# Patient Record
Sex: FEMALE | Race: ASIAN | NOT HISPANIC OR LATINO
[De-identification: names, ages, dates, MRNs, and addresses within clinical notes are randomized per-mention and may not be internally consistent; named-entity substitution may affect disease eponyms.]

---

## 2023-07-12 ENCOUNTER — TRANSCRIPTION ENCOUNTER (OUTPATIENT)
Age: 33
End: 2023-07-12

## 2023-07-13 ENCOUNTER — INPATIENT (INPATIENT)
Facility: HOSPITAL | Age: 33
LOS: 0 days | Discharge: HOME | End: 2023-07-13
Attending: OBSTETRICS & GYNECOLOGY | Admitting: OBSTETRICS & GYNECOLOGY
Payer: COMMERCIAL

## 2023-07-13 ENCOUNTER — RESULT REVIEW (OUTPATIENT)
Age: 33
End: 2023-07-13

## 2023-07-13 ENCOUNTER — TRANSCRIPTION ENCOUNTER (OUTPATIENT)
Age: 33
End: 2023-07-13

## 2023-07-13 VITALS
RESPIRATION RATE: 16 BRPM | DIASTOLIC BLOOD PRESSURE: 67 MMHG | OXYGEN SATURATION: 100 % | HEART RATE: 84 BPM | SYSTOLIC BLOOD PRESSURE: 109 MMHG

## 2023-07-13 VITALS
OXYGEN SATURATION: 100 % | HEART RATE: 88 BPM | TEMPERATURE: 98 F | DIASTOLIC BLOOD PRESSURE: 57 MMHG | SYSTOLIC BLOOD PRESSURE: 103 MMHG | RESPIRATION RATE: 15 BRPM

## 2023-07-13 DIAGNOSIS — O00.90 UNSPECIFIED ECTOPIC PREGNANCY WITHOUT INTRAUTERINE PREGNANCY: ICD-10-CM

## 2023-07-13 LAB
ALBUMIN SERPL ELPH-MCNC: 4.5 G/DL — SIGNIFICANT CHANGE UP (ref 3.3–5)
ALP SERPL-CCNC: 64 U/L — SIGNIFICANT CHANGE UP (ref 40–120)
ALT FLD-CCNC: 19 U/L — SIGNIFICANT CHANGE UP (ref 4–33)
ANION GAP SERPL CALC-SCNC: 15 MMOL/L — HIGH (ref 7–14)
APTT BLD: 23.4 SEC — LOW (ref 27–36.3)
AST SERPL-CCNC: 24 U/L — SIGNIFICANT CHANGE UP (ref 4–32)
BASOPHILS # BLD AUTO: 0.04 K/UL — SIGNIFICANT CHANGE UP (ref 0–0.2)
BASOPHILS NFR BLD AUTO: 0.3 % — SIGNIFICANT CHANGE UP (ref 0–2)
BILIRUB SERPL-MCNC: 0.8 MG/DL — SIGNIFICANT CHANGE UP (ref 0.2–1.2)
BLD GP AB SCN SERPL QL: NEGATIVE — SIGNIFICANT CHANGE UP
BUN SERPL-MCNC: 9 MG/DL — SIGNIFICANT CHANGE UP (ref 7–23)
CALCIUM SERPL-MCNC: 9.7 MG/DL — SIGNIFICANT CHANGE UP (ref 8.4–10.5)
CHLORIDE SERPL-SCNC: 99 MMOL/L — SIGNIFICANT CHANGE UP (ref 98–107)
CO2 SERPL-SCNC: 17 MMOL/L — LOW (ref 22–31)
CREAT SERPL-MCNC: 0.72 MG/DL — SIGNIFICANT CHANGE UP (ref 0.5–1.3)
EGFR: 115 ML/MIN/1.73M2 — SIGNIFICANT CHANGE UP
EOSINOPHIL # BLD AUTO: 0.02 K/UL — SIGNIFICANT CHANGE UP (ref 0–0.5)
EOSINOPHIL NFR BLD AUTO: 0.2 % — SIGNIFICANT CHANGE UP (ref 0–6)
GLUCOSE SERPL-MCNC: 124 MG/DL — HIGH (ref 70–99)
HCG SERPL-ACNC: 2462 MIU/ML — SIGNIFICANT CHANGE UP
HCT VFR BLD CALC: 33.5 % — LOW (ref 34.5–45)
HGB BLD-MCNC: 10.9 G/DL — LOW (ref 11.5–15.5)
IANC: 10.8 K/UL — HIGH (ref 1.8–7.4)
IMM GRANULOCYTES NFR BLD AUTO: 0.2 % — SIGNIFICANT CHANGE UP (ref 0–0.9)
INR BLD: 1.18 RATIO — HIGH (ref 0.88–1.16)
LYMPHOCYTES # BLD AUTO: 1.01 K/UL — SIGNIFICANT CHANGE UP (ref 1–3.3)
LYMPHOCYTES # BLD AUTO: 8.2 % — LOW (ref 13–44)
MCHC RBC-ENTMCNC: 26.3 PG — LOW (ref 27–34)
MCHC RBC-ENTMCNC: 32.5 GM/DL — SIGNIFICANT CHANGE UP (ref 32–36)
MCV RBC AUTO: 80.7 FL — SIGNIFICANT CHANGE UP (ref 80–100)
MONOCYTES # BLD AUTO: 0.41 K/UL — SIGNIFICANT CHANGE UP (ref 0–0.9)
MONOCYTES NFR BLD AUTO: 3.3 % — SIGNIFICANT CHANGE UP (ref 2–14)
NEUTROPHILS # BLD AUTO: 10.8 K/UL — HIGH (ref 1.8–7.4)
NEUTROPHILS NFR BLD AUTO: 87.8 % — HIGH (ref 43–77)
NRBC # BLD: 0 /100 WBCS — SIGNIFICANT CHANGE UP (ref 0–0)
NRBC # FLD: 0 K/UL — SIGNIFICANT CHANGE UP (ref 0–0)
PLATELET # BLD AUTO: 261 K/UL — SIGNIFICANT CHANGE UP (ref 150–400)
POTASSIUM SERPL-MCNC: 4.2 MMOL/L — SIGNIFICANT CHANGE UP (ref 3.5–5.3)
POTASSIUM SERPL-SCNC: 4.2 MMOL/L — SIGNIFICANT CHANGE UP (ref 3.5–5.3)
PROT SERPL-MCNC: 7.8 G/DL — SIGNIFICANT CHANGE UP (ref 6–8.3)
PROTHROM AB SERPL-ACNC: 13.7 SEC — HIGH (ref 10.5–13.4)
RBC # BLD: 4.15 M/UL — SIGNIFICANT CHANGE UP (ref 3.8–5.2)
RBC # FLD: 13.9 % — SIGNIFICANT CHANGE UP (ref 10.3–14.5)
RH IG SCN BLD-IMP: POSITIVE — SIGNIFICANT CHANGE UP
SODIUM SERPL-SCNC: 131 MMOL/L — LOW (ref 135–145)
WBC # BLD: 12.31 K/UL — HIGH (ref 3.8–10.5)
WBC # FLD AUTO: 12.31 K/UL — HIGH (ref 3.8–10.5)

## 2023-07-13 PROCEDURE — 76817 TRANSVAGINAL US OBSTETRIC: CPT | Mod: 26

## 2023-07-13 PROCEDURE — 88305 TISSUE EXAM BY PATHOLOGIST: CPT | Mod: 26

## 2023-07-13 PROCEDURE — 58661 LAPAROSCOPY REMOVE ADNEXA: CPT | Mod: GC

## 2023-07-13 PROCEDURE — 99285 EMERGENCY DEPT VISIT HI MDM: CPT

## 2023-07-13 RX ORDER — SODIUM CHLORIDE 9 MG/ML
1000 INJECTION, SOLUTION INTRAVENOUS
Refills: 0 | Status: DISCONTINUED | OUTPATIENT
Start: 2023-07-13 | End: 2023-07-13

## 2023-07-13 RX ORDER — FENTANYL CITRATE 50 UG/ML
25 INJECTION INTRAVENOUS
Refills: 0 | Status: DISCONTINUED | OUTPATIENT
Start: 2023-07-13 | End: 2023-07-13

## 2023-07-13 RX ORDER — ACETAMINOPHEN 500 MG
1000 TABLET ORAL ONCE
Refills: 0 | Status: COMPLETED | OUTPATIENT
Start: 2023-07-13 | End: 2023-07-13

## 2023-07-13 RX ORDER — ONDANSETRON 8 MG/1
4 TABLET, FILM COATED ORAL ONCE
Refills: 0 | Status: DISCONTINUED | OUTPATIENT
Start: 2023-07-13 | End: 2023-07-13

## 2023-07-13 RX ORDER — SODIUM CHLORIDE 9 MG/ML
1000 INJECTION INTRAMUSCULAR; INTRAVENOUS; SUBCUTANEOUS ONCE
Refills: 0 | Status: COMPLETED | OUTPATIENT
Start: 2023-07-13 | End: 2023-07-13

## 2023-07-13 RX ORDER — OXYCODONE HYDROCHLORIDE 5 MG/1
1 TABLET ORAL
Qty: 5 | Refills: 0
Start: 2023-07-13

## 2023-07-13 RX ORDER — MORPHINE SULFATE 50 MG/1
2 CAPSULE, EXTENDED RELEASE ORAL ONCE
Refills: 0 | Status: DISCONTINUED | OUTPATIENT
Start: 2023-07-13 | End: 2023-07-13

## 2023-07-13 RX ORDER — FENTANYL CITRATE 50 UG/ML
50 INJECTION INTRAVENOUS
Refills: 0 | Status: DISCONTINUED | OUTPATIENT
Start: 2023-07-13 | End: 2023-07-13

## 2023-07-13 RX ADMIN — SODIUM CHLORIDE 1000 MILLILITER(S): 9 INJECTION INTRAMUSCULAR; INTRAVENOUS; SUBCUTANEOUS at 02:01

## 2023-07-13 RX ADMIN — Medication 1000 MILLIGRAM(S): at 02:35

## 2023-07-13 RX ADMIN — MORPHINE SULFATE 2 MILLIGRAM(S): 50 CAPSULE, EXTENDED RELEASE ORAL at 03:12

## 2023-07-13 RX ADMIN — Medication 400 MILLIGRAM(S): at 02:01

## 2023-07-13 NOTE — H&P ADULT - NSHPLABSRESULTS_GEN_ALL_CORE
LABS:                        10.9   12.31 )-----------( 261      ( 13 Jul 2023 01:41 )             33.5     07-13    131<L>  |  99  |  9   ----------------------------<  124<H>  4.2   |  17<L>  |  0.72    Ca    9.7      13 Jul 2023 01:41    TPro  7.8  /  Alb  4.5  /  TBili  0.8  /  DBili  x   /  AST  24  /  ALT  19  /  AlkPhos  64  07-13      Urinalysis Basic - ( 13 Jul 2023 01:41 )    Color: x / Appearance: x / SG: x / pH: x  Gluc: 124 mg/dL / Ketone: x  / Bili: x / Urobili: x   Blood: x / Protein: x / Nitrite: x   Leuk Esterase: x / RBC: x / WBC x   Sq Epi: x / Non Sq Epi: x / Bacteria: x      RADIOLOGY & ADDITIONAL STUDIES:  < from: US Transvaginal, OB (07.13.23 @ 04:52) >  ACC: [id number] EXAM:  US OB TRANSVAGINAL   ORDERED BY: ADRIAN LEA     PROCEDURE DATE:  07/13/2023          INTERPRETATION:  CLINICAL INFORMATION: Pelvic pain and vaginal spotting.   History of ovarian torsion status post cystectomy. Beta-hCG level 2462.    LMP: 06/03/2023    Estimated Gestational Age by LMP: 5 weeks 5 days    COMPARISON: None available.    Endovaginal and transabdominal pelvic sonogram. Color and Spectral   Doppler was performed.    FINDINGS:  Uterus: No intrauterine gestation. Thickened endometrium measuring 1.8   cm. There is a 1.4 x 1.8 x 1.7 cm posterior intramural fibroid.    Right ovary: 3.7 cm x 2.9 cm x 2.8 cm. Within normal limits. Corpus   luteal cyst. Normal arterial and venous waveforms.  Left ovary: Not welldelineated. There is a left adnexal complex   heterogeneous lesion measuring 2.1 x 2.3 x 1.4 cm with a hyperechoic   ring, internal anechoic region, and peripheral vascularity.    Fluid: Complex fluid within the cul-de-sac and adjacent to the bilateral   adnexa.    IMPRESSION:  Findings concerning for ruptured left adnexal ectopic pregnancy.    Findings were discussed by Dr. Michael with Dr. Lea on 7/13/2023   4:26 AM with read back confirmation.    --- End of Report ---      ALEISHA MICHAEL MD; Resident Radiologist  This document has been electronically signed.  ZAK PAUL MD; Attending Radiologist  This document has been electronically signed. Jul 13 2023  5:00AM    < end of copied text >

## 2023-07-13 NOTE — BRIEF OPERATIVE NOTE - NSICDXBRIEFPREOP_GEN_ALL_CORE_FT
PRE-OP DIAGNOSIS:  Ruptured left tubal ectopic pregnancy causing hemoperitoneum 13-Jul-2023 10:37:02  Ladarius Lopez

## 2023-07-13 NOTE — H&P ADULT - NSHPPHYSICALEXAM_GEN_ALL_CORE
Vital Signs Last 24 Hrs  T(C): 36.8 (13 Jul 2023 04:40), Max: 37.1 (13 Jul 2023 01:55)  T(F): 98.3 (13 Jul 2023 04:40), Max: 98.7 (13 Jul 2023 01:55)  HR: 71 (13 Jul 2023 04:40) (71 - 84)  BP: 108/62 (13 Jul 2023 04:40) (108/62 - 109/67)  RR: 18 (13 Jul 2023 04:40) (16 - 18)  SpO2: 100% (13 Jul 2023 04:40) (100% - 100%)    Parameters below as of 13 Jul 2023 04:40  Patient On (Oxygen Delivery Method): room air    Physical Exam:   General: sitting comfortably in bed, NAD   HEENT: neck supple, full ROM  CV: RR S1S2 no m/r/g  Lungs: CTA b/l, good air flow b/l   Back: No CVA tenderness  Abd: Soft, non-tender, non-distended.  Bowel sounds present.    :  No bleeding on pad. External labia wnl. Bimanual exam with no cervical motion tenderness, uterus wnl, adnexa non palpable b/l.  Cervix closed vs. Cervix dilated *** cm   Speculum Exam: No active bleeding from os. Physiologic discharge.    Ext: non-tender b/l, no edema

## 2023-07-13 NOTE — ED PROVIDER NOTE - PHYSICAL EXAMINATION
GENERAL: Awake, alert, NAD  HEENT: NC/AT, moist mucous membranes, PERRL, EOMI  LUNGS: CTAB, no wheezes or crackles   CARDIAC: RRR, no m/r/g  ABDOMEN: Lower abdomen/pelvis TTP. Soft, normal BS, non distended, no rebound, no guarding  : Pelvic exam chaperoned by Dr. Corcoran - os closed, blood pooled in vault, diffusely tender on internal bimanual exam, no abnormalities externally  BACK: No midline spinal tenderness, no CVA tenderness  EXT: No edema, no calf tenderness, 2+ DP pulses bilaterally, no deformities.  NEURO: A&Ox3. Moving all extremities.  SKIN: Warm and dry. No rash.  PSYCH: Normal affect.

## 2023-07-13 NOTE — CHART NOTE - NSCHARTNOTEFT_GEN_A_CORE
Patient's , Myke Nino, is the emergency contact with cell phone number [phone]. This number is both on file in [place] and was provided to me personally by the patient prior to surgery. I attempted contacting the  at 10:40am to update him that pt is out of surgery, no answer, voicemail left. I called again at 12:10pm, no answer.  is not physically present in surgical waiting area.    Seamus PGY2

## 2023-07-13 NOTE — H&P ADULT - ATTENDING COMMENTS
R/A/B of EUA and laparoscopic salpingectomy/removal for presumed ruptured ectopic discussed with pt.  Pt aware that there is a possibility of laparotomy.  Risk of bleeding, injury to bladder/bowel/ureters/vessels d/w with pt.  All questions and concerns were addressed

## 2023-07-13 NOTE — H&P ADULT - ASSESSMENT
A/P: 33yo  (LMP 6/3) at 5w5d presenting with abdominal pain and VB x2d with TVUS concerning for ruptured L adnexal ectopic pregnancy with complex fluid within the cul-de-sac and adjacent to the b/l adnexa. In the ED, VSS, patient with some relief of pain with morphine and IV Tylenol. H/H 10.9/33.5. Patient is hemodynamically stable. Plan to admit patient to GYN for emergent surgical management.    Neuro: IV pain medication prn  CV: Patient hemodynamically stable- will continue to monitor vitals closely.   Pulm: saturating well on room air   GI: NPO for OR   : Feldman to be placed intra-operatively.   Reproductive: Ruptured ectopic pregnancy:  patient to go to OR for diagnostic laparoscopy, unilateral salpingectomy, possible unilateral oopherectomy, possible exploratory laparotomy.  Patient counseled on risks of surgery including bleeding, infection and damage to surrounding organs.  All questions/concerns of patient addressed. All consents signed with patient.    Heme: SCD's in OR for DVT ppx.  Aggressive and early ambulation post-operatively for DVT ppx.   ID: afebrile   FEN: LR@125.  Replete electolytes prn   Dispo: To OR for procedure as detailed above    D/w Dr. Mars,  and Dr. Walker, GYN surgeon  Jewel Boothe, PGY-2   A/P: 31yo  (LMP 6/3) at 5w5d presenting with abdominal pain and VB x2d with TVUS concerning for ruptured L adnexal ectopic pregnancy with complex fluid within the cul-de-sac and adjacent to the b/l adnexa. In the ED, VSS, patient with some relief of pain with morphine and IV Tylenol. H/H 10.9/33.5. Patient is hemodynamically stable. Plan to admit patient to GYN for emergent surgical management.    Neuro: IV pain medication prn  CV: Patient hemodynamically stable- will continue to monitor vitals closely.   Pulm: saturating well on room air   GI: NPO for OR   : Feldman to be placed intra-operatively.   Reproductive: Ruptured ectopic pregnancy:  patient to go to OR for diagnostic laparoscopy, unilateral salpingectomy, possible unilateral oopherectomy, possible exploratory laparotomy.  Patient counseled on risks of surgery including bleeding, infection and damage to surrounding organs.  All questions/concerns of patient addressed. All consents signed with patient.    Heme: SCD's in OR for DVT ppx.  Aggressive and early ambulation post-operatively for DVT ppx.   ID: afebrile   FEN: LR@125.  Replete electolytes prn   Dispo: To OR for procedure as detailed above    D/w Dr. Mars,  and Dr. Walker, GYN surgeon  Jewel Boothe, PGY-2     33y/o   LMP 6/3 with suspected ruptures L tubal ectopic pregnancy admitted for urgent surgical treatment of ectopic pregnancy.  Duarte Mars M.D.

## 2023-07-13 NOTE — ASU DISCHARGE PLAN (ADULT/PEDIATRIC) - NURSING INSTRUCTIONS
DO NOT take any Tylenol (Acetaminophen) or narcotics containing Tylenol until after  _3:10pm _____ . You received Tylenol during your operation and it can cause damage to your liver if too much is taken within a 24 hour time period.   You received IV Toradol for pain management Please DO NOT take Motrin/Ibuprofen/Advil/Aleve/NSAIDs (Non-Steroidal Anti-Inflammatory Drugs) for the next 6 hours (until 3:50 PM).

## 2023-07-13 NOTE — ASU DISCHARGE PLAN (ADULT/PEDIATRIC) - MEDICATION INSTRUCTIONS
You may take Ibuprofen 600mg every 6 hours and Tylenol 650mg every 6 hours. You can stagger these medications so that every 3 hours you take one or the other.

## 2023-07-13 NOTE — BRIEF OPERATIVE NOTE - NSICDXBRIEFPOSTOP_GEN_ALL_CORE_FT
POST-OP DIAGNOSIS:  Ruptured left tubal ectopic pregnancy causing hemoperitoneum 13-Jul-2023 10:37:15  Ladarius Lopez

## 2023-07-13 NOTE — ED PROVIDER NOTE - CLINICAL SUMMARY MEDICAL DECISION MAKING FREE TEXT BOX
32-year-old G2, P0 at approximately 8 weeks gestation presenting with abdominal cramping and spotting. Hemodynamically stable, well appearing, afebrile. Lower abdomen TTP. Pelvic exam with ... Most likely  in progress. Will get labs including HCG, type and screen. TVUS eval for torsion, retained products, ectopic pregnancy. Treat pain, hydrate. Dispo per findings. 32-year-old G2, P0 at approximately 8 weeks gestation presenting with abdominal cramping and spotting. Hemodynamically stable, well appearing, afebrile. Lower abdomen TTP. Pelvic exam with os closed, blood pooled in vault, diffusely tender on internal bimanual exam, no abnormalities externally Most likely  in progress. Will get labs including HCG, type and screen. TVUS eval for torsion, retained products, ectopic pregnancy. Treat pain, hydrate. Dispo per findings.

## 2023-07-13 NOTE — H&P ADULT - HISTORY OF PRESENT ILLNESS
ESTEFANY COX  32y  Female [id number]    HPI: 33yo  (LMP 6/3) at 5w5d presenting with pelvic pain and vaginal bleeding since yesterday. Patient states she had sharp lower abdominal pain, 10/10 in severity, with associated n/v/diarrhea and lightheadedness. Did not take any pain medication at home. She also reports some vaginal spotting with increased bleeding today. In the ED, patient received morphine and IV Tylenol with some relief in her pain to 4/10. TVUS performed and concerning for ruptured L adnexal ectopic pregnancy, which GYN was consulted for. Patient denies fevers/chills, CP, SOB.    Name of GYN Physician: None    ObHx:  - h/o ectopic s/p MTX ()  GynHx: h/o L ovarian cyst c/b torsion s/p LSC L ov cystectomy/detorsion (). Denies fibroids, cysts, endometriosis, STIs, Abnormal pap smears   PMHx: Denies  SurgHx: LSC L ov cystectomy/detorsion (), L breast lumpectomy (benign) several years ago  Meds: Denies  Allergies: Denies  Social History:  Denies smoking use, drug use, alcohol use.

## 2023-07-13 NOTE — BRIEF OPERATIVE NOTE - OPERATION/FINDINGS
LSC survey of the abdomen revealed a grossly normal appearing liver edge, stomach and bowel. LSC survey of the pelvis revealed a grossly normal appearing uterus, right fallopian tube, and right ovary. Left fallopian tube significant for clots at the region suspicious for rupture, no active bleeding. Left ovary grossly normal appearing. No significant adhesions noted. Moderate volume dark red hemoperitoneum with clots noted, 300cc total hemoperitoneum evacuated.

## 2023-07-13 NOTE — ED PROVIDER NOTE - NS ED ROS FT
CONST: +subjective fevers, no chills  EYES: no pain, no vision changes  ENT: no sore throat, no ear pain, no change in hearing  CV: no chest pain, no leg swelling  RESP: no shortness of breath, no cough  ABD: +abdominal pain, +nausea, +vomiting, no diarrhea  : no dysuria, no flank pain, no hematuria, +vaginal bleeding  MSK: no back pain, no extremity pain  NEURO: no headache, +syncope  HEME: no easy bleeding  SKIN:  no rash

## 2023-07-13 NOTE — CHART NOTE - NSCHARTNOTEFT_GEN_A_CORE
Patient seen and examined at bedside, recently post-op. No acute complaints at this time. Denies current abdominal pain. Has not yet been OOB. Has not voided yet postoperatively. Denies nausea/vomiting but has not tried to eat yet. Denies CP, SOB, fevers, and chills.    Vital Signs Last 24 Hours  T(C): 36.6 (07-13-23 @ 10:30), Max: 37.1 (07-13-23 @ 01:55)  HR: 82 (07-13-23 @ 12:00) (71 - 94)  BP: 94/52 (07-13-23 @ 12:00) (83/46 - 109/67)  RR: 19 (07-13-23 @ 12:00) (12 - 24)  SpO2: 99% (07-13-23 @ 12:00) (97% - 100%)    Physical Exam:  General: NAD  CV: extremities well perfused  Lungs: normal respiratory effort on room air  Abdomen: Soft, non-tender, non-distended  Incision: 3 LSC port site incisions CDI  Ext: No pain or swelling    Labs:             10.9<L>  12.31<H> )-----------( 261      ( 07-13 @ 01:41 )             33.5<L>      MEDICATIONS  (STANDING):    MEDICATIONS  (PRN):  fentaNYL    Injectable 50 MICROGram(s) IV Push every 15 minutes PRN Severe Pain (7 - 10)  fentaNYL    Injectable 25 MICROGram(s) IV Push every 5 minutes PRN Moderate Pain (4 - 6)  ondansetron Injectable 4 milliGRAM(s) IV Push once PRN Nausea and/or Vomiting      31yo s/p emergent LSC LS due to high suspicion for ruptured tubal ectopic pregnancy, now recovering well in acute post-operative state.    Neuro: Pain controlled.  CV: Hemodynamically stable  Pulm: Encourage oob/ambulation, incentive spirometer at bedside  GI: Advance diet as tolerated  : DTV@6p  Heme: SCDs for DVT PPX  ID: afebrile  Endo: no active issues  Dispo: continue routine post-op care, cleared for discharge to home after spontaneous void    Ladarius Lopez PGY2  To be discussed with GYN service attending, Dr. Walker

## 2023-07-13 NOTE — ASU DISCHARGE PLAN (ADULT/PEDIATRIC) - CARE PROVIDER_API CALL
[facility],   Oncology Building, [facility]  [address] [place] [id number]  [phone]  Phone: [phone]  Fax: (   )    -  Follow Up Time:

## 2023-07-13 NOTE — ASU DISCHARGE PLAN (ADULT/PEDIATRIC) - ASU DC SPECIAL INSTRUCTIONSFT
Regular diet as tolerated, regular activity as tolerated, no heavy lifting for 4 weeks. Nothing per vagina for 2 weeks: no intercourse, tampons or douching. Call your provider if you experience fevers, chills, worsening abdominal pain, inability to urinate or worsening vaginal bleeding. Follow up with [facility] in 2 weeks.

## 2023-07-13 NOTE — ED PROVIDER NOTE - ATTENDING CONTRIBUTION TO CARE
32-year-old female with history of left ovarian torsion status post cystectomy,  at approximately 7 weeks gestation by dates (unconfirmed IUP) here with vaginal bleeding patient reports 1 day of vaginal spotting, now with severe pelvic pain associated with heavier bleeding.  Has not been soaking through pads.  No associated fever, chest pain, shortness of breath, palpitations, syncope or LOC.    Pt lying comfortably in stretcher, awake and alert, nontoxic.  AF/VSS.  Lungs cta bl.  Cards nl S1/S2, RRR, no MRG.  Abd soft nondistended, diffuse pelvic tenderness, no rebound or guarding.  (+)small amount of blood clot in vaginal vault with diffuse tenderness/cmt, no palpable masses, os is closed.  No pedal edema or calf tenderness.    Concern for ectopic pregnancy, also consider ovarian torsion/cyst.  Plan for labs including hCG and Rh, TVUS, pain control, reassess.

## 2023-07-13 NOTE — ED ADULT NURSE NOTE - OBJECTIVE STATEMENT
Pt c/o of sharp pelvic pain that started today with some vaginal bleeding. Pt endorses she was having some spotting yesterday and that she is 8wk pregnant

## 2023-07-13 NOTE — ASU DISCHARGE PLAN (ADULT/PEDIATRIC) - NS MD DC FALL RISK RISK
For information on Fall & Injury Prevention, visit: [email or web address] OR  [email or web address] OR  https://www.cdc.gov/steadi/patient.html

## 2023-07-13 NOTE — ED PROVIDER NOTE - OBJECTIVE STATEMENT
32-year-old G2, P0 at approximately 8 weeks gestation presenting with abdominal cramping and spotting.  Spotting began yesterday, and became more severe today.  Associated with some subjective fevers today.  Patient has past surgical history of ovarian torsion with cystectomy.  1 prior miscarriage.  Has not seen OB for this pregnancy.  Always had positive at-home pregnancy test.  Patient reports she had 1 syncopal episode after the bleeding, caught herself on the bed.  No head trauma.  Also having some nausea vomiting and diarrhea.  Denies chest pain, shortness of breath, dysuria, hematuria.

## 2023-07-13 NOTE — ED PROVIDER NOTE - PROGRESS NOTE DETAILS
Progress: Jennyfer Yee PGY-3: Patient received call from radiology.  Ultrasound concerning for left ectopic pregnancy.  No intrauterine gestation appreciated.  There is a left adnexal complex lesion with free fluid in the cul-de-sac.  OB to see patient.

## 2023-07-13 NOTE — ASU DISCHARGE PLAN (ADULT/PEDIATRIC) - ACTIVITY LEVEL
No exercise/No heavy lifting/Nothing per rectum/Nothing per vagina/No tub baths/No douching/No tampons/No intercourse

## 2023-07-13 NOTE — ED ADULT TRIAGE NOTE - CHIEF COMPLAINT QUOTE
Pt c/o pelvic pain and vaginal spotting x1 day. Also endorsing n/v/d x1 day. Pt is approx. 7 weeks pregnant. +test at home. . Hx Ovarian cysts and Ovarian torsion. Pt denies cp, sob, blood clots, fevers and chills. Pt appears uncomfortable at triage.

## 2023-07-13 NOTE — ASU DISCHARGE PLAN (ADULT/PEDIATRIC) - PROVIDER TOKENS
FREE:[LAST:[[facility]],PHONE:[phone]],FAX:[(   )    -],ADDRESS:[[place] [address]  [phone]]]

## 2023-07-13 NOTE — ED ADULT NURSE NOTE - NSFALLUNIVINTERV_ED_ALL_ED
Bed/Stretcher in lowest position, wheels locked, appropriate side rails in place/Call bell, personal items and telephone in reach/Instruct patient to call for assistance before getting out of bed/chair/stretcher/Non-slip footwear applied when patient is off stretcher/[place] to call system/Physically safe environment - no spills, clutter or unnecessary equipment/Purposeful proactive rounding/Room/bathroom lighting operational, light cord in reach

## 2023-07-21 LAB — SURGICAL PATHOLOGY STUDY: SIGNIFICANT CHANGE UP

## 2023-07-24 ENCOUNTER — NON-APPOINTMENT (OUTPATIENT)
Age: 33
End: 2023-07-24

## 2023-07-27 ENCOUNTER — OUTPATIENT (OUTPATIENT)
Dept: OUTPATIENT SERVICES | Facility: HOSPITAL | Age: 33
LOS: 1 days | End: 2023-07-27

## 2023-07-27 ENCOUNTER — RESULT REVIEW (OUTPATIENT)
Age: 33
End: 2023-07-27

## 2023-07-27 ENCOUNTER — APPOINTMENT (OUTPATIENT)
Dept: OBGYN | Facility: HOSPITAL | Age: 33
End: 2023-07-27
Payer: COMMERCIAL

## 2023-07-27 VITALS
BODY MASS INDEX: 20.91 KG/M2 | HEART RATE: 78 BPM | TEMPERATURE: 97.9 F | WEIGHT: 118 LBS | DIASTOLIC BLOOD PRESSURE: 68 MMHG | SYSTOLIC BLOOD PRESSURE: 103 MMHG | HEIGHT: 63 IN

## 2023-07-27 DIAGNOSIS — Z00.00 ENCOUNTER FOR GENERAL ADULT MEDICAL EXAMINATION W/OUT ABNORMAL FINDINGS: ICD-10-CM

## 2023-07-27 DIAGNOSIS — Z87.59 PERSONAL HISTORY OF OTHER COMPLICATIONS OF PREGNANCY, CHILDBIRTH AND THE PUERPERIUM: ICD-10-CM

## 2023-07-27 DIAGNOSIS — O00.102 LEFT TUBAL PREGNANCY WITHOUT INTRAUTERINE PREGNANCY: ICD-10-CM

## 2023-07-27 LAB — HCG SERPL-ACNC: 33.1 MIU/ML — SIGNIFICANT CHANGE UP

## 2023-07-27 PROCEDURE — 99213 OFFICE O/P EST LOW 20 MIN: CPT | Mod: GC

## 2023-07-28 DIAGNOSIS — O00.102 LEFT TUBAL PREGNANCY WITHOUT INTRAUTERINE PREGNANCY: ICD-10-CM

## 2023-08-02 NOTE — PLAN
[FreeTextEntry1] : 34yo  with LMP , s/p L salpingectomy for ruptured ectopic pregnancy. Today, her vital signs are stable, she is feeling well and has no pain or bleeding. \par \par #ectopic pregnancy\par - Large decrease in serial bHCG measurements from 2462 to 33\par - Plan to continue to trend bHCG until <5 \par - Pt to return on Thursday for repeat testing\par - Pt counseled to wait 3 menstrual cycles before pursuing another pregnancy\par \par \par \par seen and discussed with Dr. Stacy,\par Srikanth Simpson PGY1

## 2023-08-02 NOTE — HISTORY OF PRESENT ILLNESS
[FreeTextEntry1] : 32yo  with LMP 2023, presenting today to clinic for post-op evaluation for L salpingectomy for ruptured tubal ectopic pregnancy. \par \par On , the patient began feeling intense sharp 10/10 pelvic pain and vaginal bleeding, accompanied by nausea, vomiting, and lightheadedness. TVUS showed L ruptured tubal ectopic pregnancy causing hemoperitoneum. Her bHCG was 2462.  She underwent a L salpingectomy without complication. Since then she has been feeling well, no pain and light bleeding decreasing over time. Pathology from the procedure returned negative for chorionic villi. \par \par Today she is presenting to clinic for further evaluation. Her bHCG today is 33. She feels well and has no complaints. No headache, chest pain, shortness of breath, abdominal pain, or bleeding at this time.

## 2023-08-09 ENCOUNTER — APPOINTMENT (OUTPATIENT)
Dept: OBGYN | Facility: CLINIC | Age: 33
End: 2023-08-09

## 2023-08-14 LAB — HCG SERPL-MCNC: 38 MIU/ML

## 2024-08-06 ENCOUNTER — INPATIENT (INPATIENT)
Facility: HOSPITAL | Age: 34
LOS: 1 days | Discharge: HOME | DRG: 951 | End: 2024-08-08
Attending: OBSTETRICS & GYNECOLOGY | Admitting: OBSTETRICS & GYNECOLOGY
Payer: MEDICAID

## 2024-08-06 VITALS — HEART RATE: 83 BPM | TEMPERATURE: 98 F | DIASTOLIC BLOOD PRESSURE: 59 MMHG | SYSTOLIC BLOOD PRESSURE: 106 MMHG

## 2024-08-06 DIAGNOSIS — O26.899 OTHER SPECIFIED PREGNANCY RELATED CONDITIONS, UNSPECIFIED TRIMESTER: ICD-10-CM

## 2024-08-06 DIAGNOSIS — Z34.80 ENCOUNTER FOR SUPERVISION OF OTHER NORMAL PREGNANCY, UNSPECIFIED TRIMESTER: ICD-10-CM

## 2024-08-06 LAB
ANISOCYTOSIS BLD QL: SLIGHT — SIGNIFICANT CHANGE UP
BASOPHILS # BLD AUTO: 0 K/UL — SIGNIFICANT CHANGE UP (ref 0–0.2)
BASOPHILS NFR BLD AUTO: 0 % — SIGNIFICANT CHANGE UP (ref 0–2)
DACRYOCYTES BLD QL SMEAR: SLIGHT — SIGNIFICANT CHANGE UP
ELLIPTOCYTES BLD QL SMEAR: SLIGHT — SIGNIFICANT CHANGE UP
EOSINOPHIL # BLD AUTO: 0.18 K/UL — SIGNIFICANT CHANGE UP (ref 0–0.5)
EOSINOPHIL NFR BLD AUTO: 1.7 % — SIGNIFICANT CHANGE UP (ref 0–6)
HCT VFR BLD CALC: 32.4 % — LOW (ref 34.5–45)
HGB BLD-MCNC: 9.7 G/DL — LOW (ref 11.5–15.5)
LYMPHOCYTES # BLD AUTO: 1.63 K/UL — SIGNIFICANT CHANGE UP (ref 1–3.3)
LYMPHOCYTES # BLD AUTO: 15.5 % — SIGNIFICANT CHANGE UP (ref 13–44)
MANUAL SMEAR VERIFICATION: SIGNIFICANT CHANGE UP
MCHC RBC-ENTMCNC: 23 PG — LOW (ref 27–34)
MCHC RBC-ENTMCNC: 29.9 GM/DL — LOW (ref 32–36)
MCV RBC AUTO: 76.8 FL — LOW (ref 80–100)
MICROCYTES BLD QL: SLIGHT — SIGNIFICANT CHANGE UP
MONOCYTES # BLD AUTO: 0.27 K/UL — SIGNIFICANT CHANGE UP (ref 0–0.9)
MONOCYTES NFR BLD AUTO: 2.6 % — SIGNIFICANT CHANGE UP (ref 2–14)
NEUTROPHILS # BLD AUTO: 8.45 K/UL — HIGH (ref 1.8–7.4)
NEUTROPHILS NFR BLD AUTO: 79.3 % — HIGH (ref 43–77)
NEUTS BAND # BLD: 0.9 % — SIGNIFICANT CHANGE UP (ref 0–8)
NEUTS HYPERSEG # BLD: PRESENT — SIGNIFICANT CHANGE UP
OVALOCYTES BLD QL SMEAR: SLIGHT — SIGNIFICANT CHANGE UP
PLAT MORPH BLD: NORMAL — SIGNIFICANT CHANGE UP
PLATELET # BLD AUTO: 238 K/UL — SIGNIFICANT CHANGE UP (ref 150–400)
POIKILOCYTOSIS BLD QL AUTO: SLIGHT — SIGNIFICANT CHANGE UP
POLYCHROMASIA BLD QL SMEAR: SLIGHT — SIGNIFICANT CHANGE UP
RBC # BLD: 4.22 M/UL — SIGNIFICANT CHANGE UP (ref 3.8–5.2)
RBC # FLD: 22.4 % — HIGH (ref 10.3–14.5)
RBC BLD AUTO: ABNORMAL
T PALLIDUM AB TITR SER: NEGATIVE — SIGNIFICANT CHANGE UP
WBC # BLD: 10.53 K/UL — HIGH (ref 3.8–10.5)
WBC # FLD AUTO: 10.53 K/UL — HIGH (ref 3.8–10.5)

## 2024-08-06 PROCEDURE — 59409 OBSTETRICAL CARE: CPT | Mod: U9,GC

## 2024-08-06 RX ORDER — IBUPROFEN 200 MG
600 TABLET ORAL EVERY 6 HOURS
Refills: 0 | Status: DISCONTINUED | OUTPATIENT
Start: 2024-08-06 | End: 2024-08-08

## 2024-08-06 RX ORDER — OXYTOCIN/RINGER'S LACTATE 20/1000 ML
41.67 PLASTIC BAG, INJECTION (ML) INTRAVENOUS
Qty: 20 | Refills: 0 | Status: DISCONTINUED | OUTPATIENT
Start: 2024-08-06 | End: 2024-08-08

## 2024-08-06 RX ORDER — OXYCODONE HYDROCHLORIDE 30 MG/1
5 TABLET ORAL
Refills: 0 | Status: DISCONTINUED | OUTPATIENT
Start: 2024-08-06 | End: 2024-08-08

## 2024-08-06 RX ORDER — BACTERIOSTATIC SODIUM CHLORIDE 0.9 %
3 VIAL (ML) INJECTION EVERY 8 HOURS
Refills: 0 | Status: DISCONTINUED | OUTPATIENT
Start: 2024-08-06 | End: 2024-08-08

## 2024-08-06 RX ORDER — DEXTROSE MONOHYDRATE, SODIUM CHLORIDE, SODIUM LACTATE, CALCIUM CHLORIDE, MAGNESIUM CHLORIDE 1.5; 538; 448; 18.4; 5.08 G/100ML; MG/100ML; MG/100ML; MG/100ML; MG/100ML
1000 SOLUTION INTRAPERITONEAL
Refills: 0 | Status: DISCONTINUED | OUTPATIENT
Start: 2024-08-06 | End: 2024-08-08

## 2024-08-06 RX ORDER — AMPICILLIN 1 G/1
1 INJECTION, POWDER, FOR SOLUTION INTRAMUSCULAR; INTRAVENOUS EVERY 4 HOURS
Refills: 0 | Status: DISCONTINUED | OUTPATIENT
Start: 2024-08-06 | End: 2024-08-06

## 2024-08-06 RX ORDER — TRISODIUM CITRATE DIHYDRATE AND CITRIC ACID MONOHYDRATE 500; 334 MG/5ML; MG/5ML
15 SOLUTION ORAL EVERY 6 HOURS
Refills: 0 | Status: DISCONTINUED | OUTPATIENT
Start: 2024-08-06 | End: 2024-08-06

## 2024-08-06 RX ORDER — OXYTOCIN/RINGER'S LACTATE 20/1000 ML
PLASTIC BAG, INJECTION (ML) INTRAVENOUS
Qty: 30 | Refills: 0 | Status: DISCONTINUED | OUTPATIENT
Start: 2024-08-06 | End: 2024-08-06

## 2024-08-06 RX ORDER — AMPICILLIN 1 G/1
2 INJECTION, POWDER, FOR SOLUTION INTRAMUSCULAR; INTRAVENOUS ONCE
Refills: 0 | Status: COMPLETED | OUTPATIENT
Start: 2024-08-06 | End: 2024-08-06

## 2024-08-06 RX ORDER — DIBUCAINE 1 %
1 OINTMENT (GRAM) TOPICAL EVERY 6 HOURS
Refills: 0 | Status: DISCONTINUED | OUTPATIENT
Start: 2024-08-06 | End: 2024-08-08

## 2024-08-06 RX ORDER — MAGNESIUM HYDROXIDE 400 MG/5ML
30 SUSPENSION, ORAL (FINAL DOSE FORM) ORAL
Refills: 0 | Status: DISCONTINUED | OUTPATIENT
Start: 2024-08-06 | End: 2024-08-08

## 2024-08-06 RX ORDER — DEXTROSE MONOHYDRATE, SODIUM CHLORIDE, SODIUM LACTATE, CALCIUM CHLORIDE, MAGNESIUM CHLORIDE 1.5; 538; 448; 18.4; 5.08 G/100ML; MG/100ML; MG/100ML; MG/100ML; MG/100ML
1000 SOLUTION INTRAPERITONEAL
Refills: 0 | Status: DISCONTINUED | OUTPATIENT
Start: 2024-08-06 | End: 2024-08-06

## 2024-08-06 RX ORDER — LANOLIN 100 %
1 OINTMENT (GRAM) TOPICAL EVERY 6 HOURS
Refills: 0 | Status: DISCONTINUED | OUTPATIENT
Start: 2024-08-06 | End: 2024-08-08

## 2024-08-06 RX ORDER — CHLORHEXIDINE GLUCONATE 500 MG/1
1 CLOTH TOPICAL DAILY
Refills: 0 | Status: DISCONTINUED | OUTPATIENT
Start: 2024-08-06 | End: 2024-08-06

## 2024-08-06 RX ORDER — OXYTOCIN/RINGER'S LACTATE 20/1000 ML
333.33 PLASTIC BAG, INJECTION (ML) INTRAVENOUS
Qty: 20 | Refills: 0 | Status: COMPLETED | OUTPATIENT
Start: 2024-08-06 | End: 2024-08-06

## 2024-08-06 RX ORDER — CLOSTRIDIUM TETANI TOXOID ANTIGEN (FORMALDEHYDE INACTIVATED), CORYNEBACTERIUM DIPHTHERIAE TOXOID ANTIGEN (FORMALDEHYDE INACTIVATED), BORDETELLA PERTUSSIS TOXOID ANTIGEN (GLUTARALDEHYDE INACTIVATED), BORDETELLA PERTUSSIS FILAMENTOUS HEMAGGLUTININ ANTIGEN (FORMALDEHYDE INACTIVATED), BORDETELLA PERTUSSIS PERTACTIN ANTIGEN, AND BORDETELLA PERTUSSIS FIMBRIAE 2/3 ANTIGEN 5; 2; 2.5; 5; 3; 5 [LF]/.5ML; [LF]/.5ML; UG/.5ML; UG/.5ML; UG/.5ML; UG/.5ML
0.5 INJECTION, SUSPENSION INTRAMUSCULAR ONCE
Refills: 0 | Status: DISCONTINUED | OUTPATIENT
Start: 2024-08-06 | End: 2024-08-08

## 2024-08-06 RX ORDER — SIMETHICONE 125 MG/1
80 TABLET, CHEWABLE ORAL EVERY 4 HOURS
Refills: 0 | Status: DISCONTINUED | OUTPATIENT
Start: 2024-08-06 | End: 2024-08-08

## 2024-08-06 RX ORDER — OXYCODONE HYDROCHLORIDE 30 MG/1
5 TABLET ORAL ONCE
Refills: 0 | Status: DISCONTINUED | OUTPATIENT
Start: 2024-08-06 | End: 2024-08-08

## 2024-08-06 RX ORDER — HYDROCORTISONE 1 %
1 CREAM (GRAM) TOPICAL EVERY 6 HOURS
Refills: 0 | Status: DISCONTINUED | OUTPATIENT
Start: 2024-08-06 | End: 2024-08-08

## 2024-08-06 RX ORDER — DIPHENHYDRAMINE HCL 25 MG
25 CAPSULE ORAL EVERY 6 HOURS
Refills: 0 | Status: DISCONTINUED | OUTPATIENT
Start: 2024-08-06 | End: 2024-08-08

## 2024-08-06 RX ORDER — WITCH HAZEL 500 MG/1
1 CLOTH TOPICAL EVERY 4 HOURS
Refills: 0 | Status: DISCONTINUED | OUTPATIENT
Start: 2024-08-06 | End: 2024-08-08

## 2024-08-06 RX ORDER — ACETAMINOPHEN 500 MG
975 TABLET ORAL
Refills: 0 | Status: DISCONTINUED | OUTPATIENT
Start: 2024-08-06 | End: 2024-08-08

## 2024-08-06 RX ORDER — IBUPROFEN 200 MG
600 TABLET ORAL EVERY 6 HOURS
Refills: 0 | Status: COMPLETED | OUTPATIENT
Start: 2024-08-06 | End: 2025-07-05

## 2024-08-06 RX ORDER — CRANBERRY FRUIT EXTRACT 650 MG
1 CAPSULE ORAL DAILY
Refills: 0 | Status: DISCONTINUED | OUTPATIENT
Start: 2024-08-06 | End: 2024-08-08

## 2024-08-06 RX ORDER — KETOROLAC TROMETHAMINE 10 MG
30 TABLET ORAL ONCE
Refills: 0 | Status: DISCONTINUED | OUTPATIENT
Start: 2024-08-06 | End: 2024-08-06

## 2024-08-06 RX ADMIN — Medication 2 MILLIUNIT(S)/MIN: at 17:14

## 2024-08-06 RX ADMIN — AMPICILLIN 108 GRAM(S): 1 INJECTION, POWDER, FOR SOLUTION INTRAMUSCULAR; INTRAVENOUS at 15:15

## 2024-08-06 RX ADMIN — Medication 3 MILLILITER(S): at 21:50

## 2024-08-06 RX ADMIN — Medication 975 MILLIGRAM(S): at 23:23

## 2024-08-06 RX ADMIN — AMPICILLIN 200 GRAM(S): 1 INJECTION, POWDER, FOR SOLUTION INTRAMUSCULAR; INTRAVENOUS at 11:15

## 2024-08-06 RX ADMIN — DEXTROSE MONOHYDRATE, SODIUM CHLORIDE, SODIUM LACTATE, CALCIUM CHLORIDE, MAGNESIUM CHLORIDE 125 MILLILITER(S): 1.5; 538; 448; 18.4; 5.08 SOLUTION INTRAPERITONEAL at 11:26

## 2024-08-06 RX ADMIN — DEXTROSE MONOHYDRATE, SODIUM CHLORIDE, SODIUM LACTATE, CALCIUM CHLORIDE, MAGNESIUM CHLORIDE 125 MILLILITER(S): 1.5; 538; 448; 18.4; 5.08 SOLUTION INTRAPERITONEAL at 11:47

## 2024-08-06 RX ADMIN — Medication 975 MILLIGRAM(S): at 23:51

## 2024-08-06 RX ADMIN — Medication 1000 MILLIUNIT(S)/MIN: at 18:03

## 2024-08-06 RX ADMIN — Medication 30 MILLIGRAM(S): at 19:55

## 2024-08-06 NOTE — OB RN DELIVERY SUMMARY - NS_SEPSISRSKCALC_OBGYN_ALL_OB_FT
EOS calculated successfully. EOS Risk Factor: 0.5/1000 live births ([facility] national incidence); GA=39w1d; Temp=98.42; ROM=4.367; GBS='Negative'; Antibiotics='GBS specific antibiotics > 2 hrs prior to birth'

## 2024-08-06 NOTE — OB PROVIDER H&P - ATTENDING COMMENTS
OB attg note    33yo  at 39+1 here for early labor desiring epidural, suspected SROM last night. Hx lapx x 2 for ectopic and torsion, hx anemia, GBS pos. SV 3/90/-3, ctx regularly. Plan for epidural and repeat SVE to determine need for augmentation. Amp ordered. Cat 1 tracing, anticpate .    Moi QUIJANO

## 2024-08-06 NOTE — OB PROVIDER H&P - NSHPPHYSICALEXAM_GEN_ALL_CORE
ICU Vital Signs Last 24 Hrs  T(C): 36.8 (06 Aug 2024 10:09), Max: 36.8 (06 Aug 2024 09:58)  T(F): 98.2 (06 Aug 2024 10:09), Max: 98.24 (06 Aug 2024 09:58)  HR: 73 (06 Aug 2024 10:54) (67 - 88)  BP: 106/59 (06 Aug 2024 10:09) (106/59 - 106/59)  BP(mean): --  ABP: --  ABP(mean): --  RR: 16 (06 Aug 2024 10:09) (16 - 16)  SpO2: 99% (06 Aug 2024 10:54) (98% - 100%)    O2 Parameters below as of 06 Aug 2024 10:09  Patient On (Oxygen Delivery Method): room air        Gen: NAD  Abd: Soft, nontender  SVE: 3/90/-3  SSE: +pooling, copious whitish/green discharge, nitrazine/ferning negative

## 2024-08-06 NOTE — OB PROVIDER DELIVERY SUMMARY - NSPROVIDERDELIVERYNOTE_OBGYN_ALL_OB_FT
Patient was found to be fully dilated and directed to push with contractions.  Spontaneous vaginal delivery of liveborn male.  Head, shoulders and body delivered easily. Nuchal x1.  Cord was delayed 1 minute. Cord was cut.  Infant was passed to mother.  Placenta delivered spontaneously intact. Uterine massage was performed and pitocin was given.  Vaginal walls, sulci, and cervix examined and noted to be intact.  Fundus was firm. Second degree laceration repaired with 2-0 vicryl.  Good hemostasis was noted.  Count correct x2.

## 2024-08-06 NOTE — OB RN DELIVERY SUMMARY - NSSELHIDDEN_OBGYN_ALL_OB_FT
[NS_[email or web address]_ALL_OB_FT:[email or web address]=],[NS_DeliveryAssist1_OBGYN_ALL_OB_FT:[id number]=],[NS_DeliveryAssist2_OBGYN_ALL_OB_FT:[id number]=]

## 2024-08-06 NOTE — OB RN TRIAGE NOTE - NS_OBGYNHISTORY_OBGYN_ALL_OB_FT
ectopic x2  left fallopian tube ruptured  left ovarian torsion  left breast lumpectomy  GBS+  anemia

## 2024-08-06 NOTE — OB RN TRIAGE NOTE - FALL HARM RISK - UNIVERSAL INTERVENTIONS
Bed in lowest position, wheels locked, appropriate side rails in place/Call bell, personal items and telephone in reach/Instruct patient to call for assistance before getting out of bed or chair/Non-slip footwear when patient is out of bed/[place] to call system/Physically safe environment - no spills, clutter or unnecessary equipment/Purposeful Proactive Rounding/Room/bathroom lighting operational, light cord in reach

## 2024-08-06 NOTE — OB PROVIDER DELIVERY SUMMARY - NSSELHIDDEN_OBGYN_ALL_OB_FT
[NS_DeliveryAttending1_OBGYN_ALL_OB_FT:[email or web address]=],[NS_DeliveryAssist1_OBGYN_ALL_OB_FT:[id number]=],[NS_DeliveryAssist2_OBGYN_ALL_OB_FT:[id number]=]

## 2024-08-06 NOTE — OB RN PATIENT PROFILE - FALL HARM RISK - UNIVERSAL INTERVENTIONS
Bed in lowest position, wheels locked, appropriate side rails in place/Call bell, personal items and telephone in reach/Instruct patient to call for assistance before getting out of bed or chair/Non-slip footwear when patient is out of bed/[place] to call system/Physically safe environment - no spills, clutter or unnecessary equipment/Purposeful Proactive Rounding/Room/bathroom lighting operational, light cord in reach

## 2024-08-06 NOTE — OB PROVIDER H&P - ASSESSMENT
34y  at 39+1 presenting in early labor, requesting epidural, questionable SROM     - Admit to L&D  - Routine labs   - EFM & toco  - CLD & IVF     - Amp for GBS pos  - Epidural   - Eval in 2 hr for pitocin    D/w Yuliana Banks, PGY4

## 2024-08-06 NOTE — OB PROVIDER H&P - HISTORY OF PRESENT ILLNESS
35yo  @39w1d presenting after gush of fluid at 8pm last night followed by other episodes of LOF. Having painful CTX all night. Denies vaginal bleeding. Good FM.     PNC: Izzy Kong   GBS: pos  EFW: 3400  ObHx:    ruptured ectopic s/p LSC LS  SAB x1 s/p medication  GynHx:  LSC L ovarian cystectomy for torsion   MedHx: denies  SrgHx: LSC x2 as above, L breast lumpectomy (benign)   PsychHx: denies  SocialHx: denies  AllergyHx: NKDA  RxHx: PNV

## 2024-08-06 NOTE — OB PROVIDER LABOR PROGRESS NOTE - ASSESSMENT
34 year old  admitted in labor was reassessed due to deceleration  - fully dilated  - tachysystole, decel resolved with repositioning terb not given   - anticipate     Discussed with Dr. Bridges. Patient examined by Dr. Jocelyn Wells PGY1
- AROM to clear fluid  - c/w exp management    R Banks PGY4

## 2024-08-07 RX ADMIN — Medication 975 MILLIGRAM(S): at 18:53

## 2024-08-07 RX ADMIN — Medication 600 MILLIGRAM(S): at 08:58

## 2024-08-07 RX ADMIN — Medication 3 MILLILITER(S): at 05:03

## 2024-08-07 RX ADMIN — Medication 600 MILLIGRAM(S): at 15:55

## 2024-08-07 RX ADMIN — Medication 600 MILLIGRAM(S): at 21:10

## 2024-08-07 RX ADMIN — Medication 975 MILLIGRAM(S): at 12:00

## 2024-08-07 RX ADMIN — Medication 975 MILLIGRAM(S): at 05:33

## 2024-08-07 RX ADMIN — Medication 600 MILLIGRAM(S): at 15:25

## 2024-08-07 RX ADMIN — Medication 975 MILLIGRAM(S): at 12:30

## 2024-08-07 RX ADMIN — Medication 975 MILLIGRAM(S): at 18:23

## 2024-08-07 RX ADMIN — Medication 975 MILLIGRAM(S): at 23:30

## 2024-08-07 RX ADMIN — Medication 1 TABLET(S): at 14:21

## 2024-08-07 RX ADMIN — Medication 975 MILLIGRAM(S): at 05:03

## 2024-08-07 RX ADMIN — Medication 600 MILLIGRAM(S): at 09:28

## 2024-08-07 RX ADMIN — Medication 600 MILLIGRAM(S): at 20:40

## 2024-08-07 NOTE — PROGRESS NOTE ADULT - ASSESSMENT
33y/o  PPD#_ from _ in pregnancy complicated by _.   Patient is currently in stable condition.    #gHTN/Preeclampsia/Preeclampsia w/ severe features/Superimposed preeclampsia  - BP's well-controlled overnight, ***  - HELLP labs wnl, P/C ***  - ***s/p Mg for seizure prophylaxis  - Continue w/ ***  - Continue to monitor BP's    #Routine Postpartum Care  - Continue with PO analgesia  - Increase ambulation  - Continue regular diet  - ***No labs    Dea Wells  PGY-1 35y/o  PPD#1 from . . Patient is currently in stable condition.    #Routine Postpartum Care  - Continue with PO analgesia  - Increase ambulation as tolerated   - Continue regular diet  - No labs    Dea Wells  PGY-1

## 2024-08-08 ENCOUNTER — TRANSCRIPTION ENCOUNTER (OUTPATIENT)
Age: 34
End: 2024-08-08

## 2024-08-08 VITALS
RESPIRATION RATE: 18 BRPM | SYSTOLIC BLOOD PRESSURE: 91 MMHG | TEMPERATURE: 98 F | OXYGEN SATURATION: 98 % | HEART RATE: 66 BPM | DIASTOLIC BLOOD PRESSURE: 57 MMHG

## 2024-08-08 PROCEDURE — 86901 BLOOD TYPING SEROLOGIC RH(D): CPT

## 2024-08-08 PROCEDURE — 86780 TREPONEMA PALLIDUM: CPT

## 2024-08-08 PROCEDURE — 59050 FETAL MONITOR W/REPORT: CPT

## 2024-08-08 PROCEDURE — 86900 BLOOD TYPING SEROLOGIC ABO: CPT

## 2024-08-08 PROCEDURE — 86850 RBC ANTIBODY SCREEN: CPT

## 2024-08-08 PROCEDURE — 85025 COMPLETE CBC W/AUTO DIFF WBC: CPT

## 2024-08-08 RX ORDER — IBUPROFEN 200 MG
1 TABLET ORAL
Qty: 0 | Refills: 0 | DISCHARGE
Start: 2024-08-08

## 2024-08-08 RX ORDER — CRANBERRY FRUIT EXTRACT 650 MG
1 CAPSULE ORAL
Qty: 0 | Refills: 0 | DISCHARGE
Start: 2024-08-08

## 2024-08-08 RX ORDER — ACETAMINOPHEN 500 MG
3 TABLET ORAL
Qty: 0 | Refills: 0 | DISCHARGE
Start: 2024-08-08

## 2024-08-08 RX ADMIN — Medication 975 MILLIGRAM(S): at 05:33

## 2024-08-08 RX ADMIN — Medication 600 MILLIGRAM(S): at 15:10

## 2024-08-08 RX ADMIN — Medication 975 MILLIGRAM(S): at 06:03

## 2024-08-08 RX ADMIN — Medication 600 MILLIGRAM(S): at 15:40

## 2024-08-08 RX ADMIN — Medication 600 MILLIGRAM(S): at 09:32

## 2024-08-08 RX ADMIN — Medication 975 MILLIGRAM(S): at 00:00

## 2024-08-08 RX ADMIN — Medication 30 MILLILITER(S): at 09:08

## 2024-08-08 RX ADMIN — Medication 975 MILLIGRAM(S): at 12:36

## 2024-08-08 RX ADMIN — Medication 600 MILLIGRAM(S): at 09:02

## 2024-08-08 NOTE — PROGRESS NOTE ADULT - SUBJECTIVE AND OBJECTIVE BOX
Patient seen and examined at bedside, no acute overnight events. No acute complaints, pain well controlled. Patient is ambulating, voiding spontaneously, passing gas, and tolerating regular diet. Denies CP, SOB, N/V, HA, blurred vision, epigastric pain.    Vital Signs Last 24 Hours  T(C): 36.4 (08-07-24 @ 05:33), Max: 37.3 (08-06-24 @ 18:30)  HR: 76 (08-07-24 @ 05:33) (61 - 114)  BP: 96/61 (08-07-24 @ 05:33) (91/65 - 128/58)  RR: 18 (08-07-24 @ 05:33) (16 - 18)  SpO2: 100% (08-07-24 @ 05:33) (86% - 100%)    Physical Exam:  General: NAD  Abdomen: Soft, non-tender, non-distended, fundus firm  Pelvic: deferred due to patient currently breastfeeding    Labs:    Blood Type: A Positive  Antibody Screen: Negative  RPR: Negative               9.7    10.53 )-----------( 238      ( 08-06 @ 11:28 )             32.4         MEDICATIONS  (STANDING):  acetaminophen     Tablet .. 975 milliGRAM(s) Oral <User Schedule>  dextrose 5% + lactated ringers. 1000 milliLiter(s) (125 mL/Hr) IV Continuous <Continuous>  diphtheria/tetanus/pertussis (acellular) Vaccine (Adacel) 0.5 milliLiter(s) IntraMuscular once  ibuprofen  Tablet. 600 milliGRAM(s) Oral every 6 hours  oxytocin Infusion 41.667 milliUNIT(s)/Min (125 mL/Hr) IV Continuous <Continuous>  prenatal multivitamin 1 Tablet(s) Oral daily  sodium chloride 0.9% lock flush 3 milliLiter(s) IV Push every 8 hours    MEDICATIONS  (PRN):  benzocaine 20%/menthol 0.5% Spray 1 Spray(s) Topical every 6 hours PRN for Perineal discomfort  dibucaine 1% Ointment 1 Application(s) Topical every 6 hours PRN Perineal discomfort  diphenhydrAMINE 25 milliGRAM(s) Oral every 6 hours PRN Pruritus  hydrocortisone 1% Cream 1 Application(s) Topical every 6 hours PRN Moderate Pain (4-6)  lanolin Ointment 1 Application(s) Topical every 6 hours PRN nipple soreness  magnesium hydroxide Suspension 30 milliLiter(s) Oral two times a day PRN Constipation  oxyCODONE    IR 5 milliGRAM(s) Oral every 3 hours PRN Moderate to Severe Pain (4-10)  oxyCODONE    IR 5 milliGRAM(s) Oral once PRN Moderate to Severe Pain (4-10)  pramoxine 1%/zinc 5% Cream 1 Application(s) Topical every 4 hours PRN Moderate Pain (4-6)  simethicone 80 milliGRAM(s) Chew every 4 hours PRN Gas  witch hazel Pads 1 Application(s) Topical every 4 hours PRN Perineal discomfort  
Patient seen and examined at bedside, no acute overnight events. No acute complaints, pain well controlled. Patient is ambulating, voiding spontaneously, passing gas, and tolerating regular diet. Denies CP, SOB, N/V, HA, blurred vision, epigastric pain.    Vital Signs Last 24 Hours  T(C): 36.7 (08-08-24 @ 05:05), Max: 37 (08-07-24 @ 17:00)  HR: 66 (08-08-24 @ 05:05) (66 - 70)  BP: 91/57 (08-08-24 @ 05:05) (91/57 - 97/59)  RR: 18 (08-08-24 @ 05:05) (18 - 18)  SpO2: 98% (08-08-24 @ 05:05) (98% - 98%)    Physical Exam:  General: NAD  Abdomen: Soft, non-tender, non-distended, fundus firm  Pelvic: Lochia wnl    Labs:    Blood Type: A Positive  Antibody Screen: Negative  RPR: Negative               9.7    10.53 )-----------( 238      ( 08-06 @ 11:28 )             32.4         MEDICATIONS  (STANDING):  acetaminophen     Tablet .. 975 milliGRAM(s) Oral <User Schedule>  dextrose 5% + lactated ringers. 1000 milliLiter(s) (125 mL/Hr) IV Continuous <Continuous>  diphtheria/tetanus/pertussis (acellular) Vaccine (Adacel) 0.5 milliLiter(s) IntraMuscular once  ibuprofen  Tablet. 600 milliGRAM(s) Oral every 6 hours  oxytocin Infusion 41.667 milliUNIT(s)/Min (125 mL/Hr) IV Continuous <Continuous>  prenatal multivitamin 1 Tablet(s) Oral daily  sodium chloride 0.9% lock flush 3 milliLiter(s) IV Push every 8 hours    MEDICATIONS  (PRN):  benzocaine 20%/menthol 0.5% Spray 1 Spray(s) Topical every 6 hours PRN for Perineal discomfort  dibucaine 1% Ointment 1 Application(s) Topical every 6 hours PRN Perineal discomfort  diphenhydrAMINE 25 milliGRAM(s) Oral every 6 hours PRN Pruritus  hydrocortisone 1% Cream 1 Application(s) Topical every 6 hours PRN Moderate Pain (4-6)  lanolin Ointment 1 Application(s) Topical every 6 hours PRN nipple soreness  magnesium hydroxide Suspension 30 milliLiter(s) Oral two times a day PRN Constipation  oxyCODONE    IR 5 milliGRAM(s) Oral every 3 hours PRN Moderate to Severe Pain (4-10)  oxyCODONE    IR 5 milliGRAM(s) Oral once PRN Moderate to Severe Pain (4-10)  pramoxine 1%/zinc 5% Cream 1 Application(s) Topical every 4 hours PRN Moderate Pain (4-6)  simethicone 80 milliGRAM(s) Chew every 4 hours PRN Gas  witch hazel Pads 1 Application(s) Topical every 4 hours PRN Perineal discomfort

## 2024-08-08 NOTE — DISCHARGE NOTE OB - MEDICATION SUMMARY - MEDICATIONS TO TAKE
I will START or STAY ON the medications listed below when I get home from the hospital:    ibuprofen 600 mg oral tablet  -- 1 tab(s) by mouth every 6 hours  -- Indication: For Supervision of other normal pregnancy, antepartum    acetaminophen 325 mg oral tablet  -- 3 tab(s) by mouth every 6 hours as needed for  moderate pain  -- Indication: For Supervision of other normal pregnancy, antepartum    Prenatal Multivitamins with Folic Acid 1 mg oral tablet  -- 1 tab(s) by mouth once a day  -- Indication: For Supervision of other normal pregnancy, antepartum

## 2024-08-08 NOTE — DISCHARGE NOTE OB - PATIENT PORTAL LINK FT
You can access the FollowMyHealth Patient Portal offered by [facility] by registering at the following website: [email or web address]. By joining [facility]’s FollowMyHealth portal, you will also be able to view your health information using other applications (apps) compatible with our system.

## 2024-08-08 NOTE — DISCHARGE NOTE OB - NSDCQMSTAIRS_GEN_ALL_CORE
Medical Necessity Clause: This procedure was medically necessary because the lesions that were treated were: Render Note In Bullet Format When Appropriate: No Medical Necessity Information: It is in your best interest to select a reason for this procedure from the list below. All of these items fulfill various CMS LCD requirements except the new and changing color options. Show Spray Paint Technique Variable?: Yes Post-Care Instructions: I reviewed with the patient in detail post-care instructions. Patient is to wear sunprotection, and avoid picking at any of the treated lesions. Pt may apply Vaseline to crusted or scabbing areas. Detail Level: Simple Consent: The patient's consent was obtained including but not limited to risks of crusting, scabbing, blistering, scarring, darker or lighter pigmentary change, recurrence, incomplete removal and infection. Spray Paint Text: The liquid nitrogen was applied to the skin utilizing a spray paint frosting technique. No

## 2024-08-08 NOTE — DISCHARGE NOTE OB - CARE PLAN
1 Principal Discharge DX:	Normal vaginal delivery  Assessment and plan of treatment:	routine recovery

## 2024-08-08 NOTE — DISCHARGE NOTE OB - ADDITIONAL INSTRUCTIONS
Instructions:  Make your follow-up appointment with your doctor as ordered. No heavy lifting, driving, or strenuous activity for 6 weeks. Nothing per vagina such as tampons, intercourse, douches or tub baths for 6 weeks or until you see your doctor. Call your doctor with any signs and symptoms of infection such as fever, chills, nausea, or vomiting. Call your doctor if you're unable to tolerate food, increase in vaginal bleeding, or have difficulty urinating. Call your doctor if you have pain that is not relieved by your prescription medications. Notify your doctor with any other concerns.    NS: Call [phone] if you have any of these concerns in the next 6 weeks.    HTN/PEC: Follow-up with your doctor in 48-72 hours to check your blood pressure. A prescription was handed to you for a blood pressure cuff to monitor your blood pressures at home. Call your doctor if your blood pressure is greater than or equal to 160 systolic (top number) of 110 diastolic (bottom number) or if you experience a headache unreleived by OTC medications, blurred vision, or difficulty breathing.    Provider Follow up  NS: Please follow up for postpartum appointment in 4-6 weeks at the [facility] located at [address] [place] [address], [address] Floor, [place] [address]. Please call the office for an appointment, [phone].

## 2024-08-08 NOTE — PROGRESS NOTE ADULT - ASSESSMENT
33y/o PPD#2 from . . Patient is currently in stable condition.    #Routine Postpartum Care  - Continue with PO analgesia  - Increase ambulation as tolerated  - Continue regular diet    Dea Wells  PGY-1

## 2024-08-08 NOTE — PROGRESS NOTE ADULT - ATTENDING COMMENTS
Obstetrical  8am-6pm:  Patient seen and examined by me.  Agree with above resident note.  Elissa QUIJANO
Obstetrical  8am-6pm:  Patient seen and examined by me.  Agree with above resident note.  Elissa QUIJANO

## 2024-08-08 NOTE — DISCHARGE NOTE OB - CARE PROVIDER_API CALL
[facility],   [address] [place] [id number]  Phone: [phone]  Fax: (   )    -  Follow Up Time:

## 2024-08-08 NOTE — DISCHARGE NOTE OB - NS MD DC FALL RISK RISK
For information on Fall & Injury Prevention, visit: [email or web address] OR  [email or web address] OR  https://www.cdc.gov/steadi/patient.html

## 2024-08-08 NOTE — DISCHARGE NOTE OB - PROVIDER TOKENS
FREE:[LAST:[[facility]],PHONE:[phone]],FAX:[(   [phone]    -],ADDRESS:[[address] [place] [address]]]

## (undated) DEVICE — PACK PERI GYN

## (undated) DEVICE — DRSG TEGADERM 1.75X1.75"

## (undated) DEVICE — TROCAR COVIDIEN VERSAONE BLUNT TIP HASSAN 12MM

## (undated) DEVICE — BASIN SET SINGLE

## (undated) DEVICE — SUT MONOCRYL 4-0 27" PS-2 UNDYED

## (undated) DEVICE — D HELP - CLEARVIEW CLEARIFY SYSTEM

## (undated) DEVICE — UTERINE MANIPULATOR COOPER SURGICAL 5MM 33CM GREEN

## (undated) DEVICE — POSITIONER STRAP ARMBOARD VELCRO TS-30

## (undated) DEVICE — BLADE SURGICAL #15 CARBON

## (undated) DEVICE — LABELS BLANK W PEN

## (undated) DEVICE — LIJ/LIA-ESU FORCEFX F7H55667A: Type: DURABLE MEDICAL EQUIPMENT

## (undated) DEVICE — GLV 6.5 PROTEXIS (CREAM) MICRO

## (undated) DEVICE — INSUFFLATION NDL COVIDIEN SURGINEEDLE VERESS 120MM

## (undated) DEVICE — PROTECTOR HEEL / ELBOW FLUFFY

## (undated) DEVICE — TUBING HYDRO-SURG PLUS IRRIGATOR W SMOKEVAC & PROBE

## (undated) DEVICE — UTERINE MANIPULATOR CLINICAL INNOVATIONS CLEARVIEW 7CM

## (undated) DEVICE — TUBING OLYMPUS INSUFFLATION

## (undated) DEVICE — LIGASURE BLUNT TIP 37CM

## (undated) DEVICE — ELCTR GROUNDING PAD ADULT COVIDIEN

## (undated) DEVICE — FOLEY CATH 2-WAY 16FR 5CC LATEX LUBRICATH

## (undated) DEVICE — DRSG KERLIX MED 6"

## (undated) DEVICE — TROCAR COVIDIEN VERSAPORT BLADELESS OPTICAL 5MM STANDARD

## (undated) DEVICE — SYR LUER LOK 50CC

## (undated) DEVICE — SOL IRR POUR NS 0.9% 500ML

## (undated) DEVICE — SYR ASEPTO

## (undated) DEVICE — TROCAR COVIDIEN VERSAONE BLADED FIXATION 11MM STANDARD

## (undated) DEVICE — SUT VICRYL 0 27" UR-6

## (undated) DEVICE — VENODYNE/SCD SLEEVE CALF MEDIUM

## (undated) DEVICE — PREP BETADINE SPONGE STICKS

## (undated) DEVICE — LIGASURE MARYLAND 37CM

## (undated) DEVICE — ELCTR BOVIE PENCIL SMOKE EVACUATION

## (undated) DEVICE — TROCAR COVIDIEN VERSAONE FIXATION CANNULA 5MM

## (undated) DEVICE — GOWN XL

## (undated) DEVICE — POSITIONER PINK PAD PIGAZZI SYSTEM

## (undated) DEVICE — TUBING TRUWAVE PRESSURE MALE/FEMALE 12"

## (undated) DEVICE — FOLEY TRAY 16FR 5CC LF UMETER CLOSED

## (undated) DEVICE — SOL IRR POUR H2O 500ML

## (undated) DEVICE — PACK GENERAL LAPAROSCOPY

## (undated) DEVICE — PACK D&C

## (undated) DEVICE — TRAP SPECIMEN SPUTUM 40CC

## (undated) DEVICE — ENDOCATCH 10MM SPECIMEN POUCH

## (undated) DEVICE — TIP METZENBAUM SCISSOR MONOPOLAR ENDOCUT (ORANGE)